# Patient Record
Sex: MALE | Race: WHITE | Employment: UNEMPLOYED | URBAN - METROPOLITAN AREA
[De-identification: names, ages, dates, MRNs, and addresses within clinical notes are randomized per-mention and may not be internally consistent; named-entity substitution may affect disease eponyms.]

---

## 2017-01-30 ENCOUNTER — HOSPITAL ENCOUNTER (EMERGENCY)
Age: 47
Discharge: HOME | End: 2017-01-31
Attending: EMERGENCY MEDICINE
Payer: MEDICAID

## 2017-01-30 VITALS
BODY MASS INDEX: 27.35 KG/M2 | SYSTOLIC BLOOD PRESSURE: 122 MMHG | HEART RATE: 95 BPM | HEIGHT: 75 IN | DIASTOLIC BLOOD PRESSURE: 85 MMHG | WEIGHT: 220 LBS | RESPIRATION RATE: 16 BRPM | TEMPERATURE: 97.4 F | OXYGEN SATURATION: 99 %

## 2017-01-30 DIAGNOSIS — L03.011 PARONYCHIA, RIGHT: Primary | ICD-10-CM

## 2017-01-30 DIAGNOSIS — S29.019A THORACIC MYOFASCIAL STRAIN, INITIAL ENCOUNTER: ICD-10-CM

## 2017-01-30 PROCEDURE — 77030008320 HC SPLNT FNGR FROG DJOR -A

## 2017-01-30 PROCEDURE — 74011000250 HC RX REV CODE- 250: Performed by: PHYSICIAN ASSISTANT

## 2017-01-30 PROCEDURE — 99284 EMERGENCY DEPT VISIT MOD MDM: CPT

## 2017-01-30 PROCEDURE — 74011250637 HC RX REV CODE- 250/637: Performed by: PHYSICIAN ASSISTANT

## 2017-01-30 PROCEDURE — 75810000289 HC I&D ABSCESS SIMP/COMP/MULT

## 2017-01-30 PROCEDURE — 77030018836 HC SOL IRR NACL ICUM -A

## 2017-01-30 RX ORDER — LIDOCAINE HYDROCHLORIDE 10 MG/ML
10 INJECTION INFILTRATION; PERINEURAL
Status: COMPLETED | OUTPATIENT
Start: 2017-01-30 | End: 2017-01-30

## 2017-01-30 RX ORDER — CEPHALEXIN 500 MG/1
500 CAPSULE ORAL 4 TIMES DAILY
Qty: 28 CAP | Refills: 0 | Status: SHIPPED | OUTPATIENT
Start: 2017-01-30 | End: 2017-02-06

## 2017-01-30 RX ORDER — LIDOCAINE HYDROCHLORIDE 10 MG/ML
10 INJECTION INFILTRATION; PERINEURAL
Status: DISCONTINUED | OUTPATIENT
Start: 2017-01-30 | End: 2017-01-30

## 2017-01-30 RX ORDER — CYCLOBENZAPRINE HCL 5 MG
5 TABLET ORAL
Qty: 15 TAB | Refills: 0 | Status: SHIPPED | OUTPATIENT
Start: 2017-01-30 | End: 2018-11-23

## 2017-01-30 RX ORDER — OXYCODONE AND ACETAMINOPHEN 5; 325 MG/1; MG/1
1 TABLET ORAL
Status: COMPLETED | OUTPATIENT
Start: 2017-01-30 | End: 2017-01-30

## 2017-01-30 RX ORDER — ONDANSETRON 4 MG/1
4 TABLET, FILM COATED ORAL
Qty: 15 TAB | Refills: 0 | Status: SHIPPED | OUTPATIENT
Start: 2017-01-30 | End: 2018-11-23

## 2017-01-30 RX ORDER — IBUPROFEN 400 MG/1
800 TABLET ORAL ONCE
Status: COMPLETED | OUTPATIENT
Start: 2017-01-30 | End: 2017-01-30

## 2017-01-30 RX ORDER — HYDROCODONE BITARTRATE AND ACETAMINOPHEN 5; 325 MG/1; MG/1
1 TABLET ORAL
Qty: 12 TAB | Refills: 0 | Status: SHIPPED | OUTPATIENT
Start: 2017-01-30 | End: 2018-06-12

## 2017-01-30 RX ORDER — DIAZEPAM 5 MG/1
5 TABLET ORAL ONCE
Status: COMPLETED | OUTPATIENT
Start: 2017-01-30 | End: 2017-01-30

## 2017-01-30 RX ADMIN — DIAZEPAM 5 MG: 5 TABLET ORAL at 23:20

## 2017-01-30 RX ADMIN — IBUPROFEN 800 MG: 400 TABLET ORAL at 23:20

## 2017-01-30 RX ADMIN — LIDOCAINE HYDROCHLORIDE 10 ML: 10 INJECTION, SOLUTION INFILTRATION; PERINEURAL at 23:20

## 2017-01-30 RX ADMIN — OXYCODONE HYDROCHLORIDE AND ACETAMINOPHEN 1 TABLET: 5; 325 TABLET ORAL at 23:19

## 2017-01-31 NOTE — ED PROVIDER NOTES
HPI Comments:   10:28 PM    Radha Sams is a 55 y.o. Male with a hx of stenosis, DDD, and chronic pain who presents to ED c/o worsening sharp right upper back/neck pain s/p moving furniture 3 days ago. Associated symptoms include right arm tingling. No blunt trauma to the back. Pt also c/o right ring finger pain/swelling s/p \"getting pricked\" with a nail a few weeks ago. Pt states he \"poked a hole\" in his finger and there was drainage. He states he has hx of this in the past and had to have it drained. Pt reports he has tried taking Tylenol with no relief. Positive tobacco use. Allergic to Tramadol and Vicodin. Pt denies known trauma and any other symptoms or complaints. Patient is a 55 y.o. male presenting with finger pain. The history is provided by the patient. No  was used. Finger Pain    This is a new problem. The problem occurs constantly. The problem has been gradually worsening. The pain is present in the right fingers. Associated symptoms include numbness (right arm; tingling), back pain (upper back) and neck pain. Past Medical History:   Diagnosis Date    Chronic pain     DDD (degenerative disc disease)     Fatigue     GERD (gastroesophageal reflux disease)     Headache     Headache(784.0)     Insomnia     Neck pain     Numbness     Paresthesias     Stenosis      moderate central stenosis as well as severe formainal stenosis at C5-6 level    Weakness        History reviewed. No pertinent past surgical history.       Family History:   Problem Relation Age of Onset    Cancer Other     Heart Disease Other     Hypertension Other     Diabetes Other     Cancer Mother 48     breast ca    Diabetes Mother     Heart Disease Mother     Hypertension Mother     Cancer Father       of CA kidney with metastasis    Heart Disease Father      tripple bypass    Asthma Sister     Hypertension Maternal Aunt        Social History     Social History    Marital status: LEGALLY      Spouse name: N/A    Number of children: N/A    Years of education: N/A     Occupational History    Not on file. Social History Main Topics    Smoking status: Current Every Day Smoker     Packs/day: 1.00    Smokeless tobacco: Never Used    Alcohol use No    Drug use: Yes     Special: Marijuana    Sexual activity: No      Comment: Not for about 3 years     Other Topics Concern    Not on file     Social History Narrative         ALLERGIES: Tramadol and Vicodin [hydrocodone-acetaminophen]    Review of Systems   Musculoskeletal: Positive for arthralgias (right ring finger), back pain (upper back), joint swelling (right ring finger) and neck pain. Neurological: Positive for numbness (right arm; tingling). All other systems reviewed and are negative. Vitals:    01/30/17 2204   BP: 122/85   Pulse: 95   Resp: 16   Temp: 97.4 °F (36.3 °C)   SpO2: 99%   Weight: 99.8 kg (220 lb)   Height: 6' 3\" (1.905 m)            Physical Exam   Constitutional: He is oriented to person, place, and time. He appears well-developed and well-nourished. No distress. Appears uncomfortable, sitting up on stretcher, non toxic, no increased work of breathing    HENT:   Head: Normocephalic and atraumatic. Neck: Normal range of motion. Neck supple. C spine non tender   Cardiovascular: Normal rate, regular rhythm, normal heart sounds and intact distal pulses. No murmur heard. Pulmonary/Chest: Effort normal and breath sounds normal. No respiratory distress. He has no wheezes. He has no rales. Abdominal: Soft. Bowel sounds are normal. There is no tenderness. Musculoskeletal:        Thoracic back: He exhibits tenderness. He exhibits normal range of motion, no bony tenderness, no swelling, no edema, no deformity and no pain. Back:    Shoulder FROM, non tender   5/5 to BUE, radial pulses 2+ bilaterally, N/V intact    Lymphadenopathy:     He has no cervical adenopathy.    Neurological: He is alert and oriented to person, place, and time. Skin:   Right 4th finger, erythema and swelling with crusting to the medial fold of the nail with fluctuance, unable to express any drainage    Psychiatric: He has a normal mood and affect. Judgment normal.   Nursing note and vitals reviewed. RESULTS:    No orders to display        Labs Reviewed - No data to display    No results found for this or any previous visit (from the past 12 hour(s)). MDM  Number of Diagnoses or Management Options  Paronychia, right:   Thoracic myofascial strain, initial encounter:   Diagnosis management comments: Paronychia  Thoracic strain,  Radiculopathy doubt fracture       ED Course     MEDICATIONS GIVEN:  Medications   ibuprofen (MOTRIN) tablet 800 mg (800 mg Oral Given 1/30/17 2320)   diazePAM (VALIUM) tablet 5 mg (5 mg Oral Given 1/30/17 2320)   oxyCODONE-acetaminophen (PERCOCET) 5-325 mg per tablet 1 Tab (1 Tab Oral Given 1/30/17 2319)   lidocaine (XYLOCAINE) 10 mg/mL (1 %) injection 10 mL (10 mL SubCUTAneous Given 1/30/17 2320)        I&D Abcess Simple  Date/Time: 1/30/2017 11:37 PM  Performed by: Haile Carlos by: Arnie ROBIN     Consent:     Consent obtained:  Verbal    Consent given by:  Patient    Risks discussed:  Infection  Location:     Type:  Abscess    Location:  Upper extremity    Upper extremity location:  Finger    Finger location:  R ring finger  Pre-procedure details:     Skin preparation:  Betadine  Anesthesia (see MAR for exact dosages):      Anesthesia method:  Nerve block    Block needle gauge:  27 G    Block anesthetic:  Lidocaine 1% w/o epi    Block injection procedure:  Anatomic landmarks identified    Block outcome:  Anesthesia achieved  Procedure type:     Complexity:  Simple  Procedure details:     Incision types:  Single straight    Incision depth:  Dermal    Scalpel blade:  11    Wound management:  Irrigated with saline    Drainage:  Bloody and purulent    Drainage amount: Moderate    Wound treatment:  Wound left open    Packing materials:  None  Post-procedure details:     Patient tolerance of procedure: Tolerated well, no immediate complications          PROGRESS NOTE:  10:28 PM  Initial assessment performed. DISCHARGE NOTE:  11:46 PM   Diana Fuller's  results have been reviewed with him. He has been counseled regarding his diagnosis, treatment, and plan. He verbally conveys understanding and agreement of the signs, symptoms, diagnosis, treatment and prognosis and additionally agrees to follow up as discussed. He also agrees with the care-plan and conveys that all of his questions have been answered. I have also provided discharge instructions for him that include: educational information regarding their diagnosis and treatment, and list of reasons why they would want to return to the ED prior to their follow-up appointment, should his condition change. The patient and/or family has been provided with education for proper Emergency Department utilization. CLINICAL IMPRESSION:    1. Paronychia, right    2. Thoracic myofascial strain, initial encounter        PLAN: DISCHARGE HOME    Follow-up Information     Follow up With Details Comments [address] [place] [address] Schedule an appointment as soon as possible for a visit Follow up with a primary care physician Becky Gutierrez [facility] [address]  [phone]    [facility] EMERGENCY DEPT  As needed, If symptoms worsen [address] [place] [address] [phone]          Discharge Medication List as of 1/30/2017 11:49 PM      START taking these medications    Details   cephALEXin (KEFLEX) 500 mg capsule Take 1 Cap by mouth four (4) times daily for 7 days. , Normal, Disp-28 Cap, R-0      HYDROcodone-acetaminophen (NORCO) 5-325 mg per tablet Take 1 Tab by mouth every four (4) hours as needed for Pain.  Max Daily Amount: 6 Tabs., Print, Disp-12 Tab, R-0      ondansetron hcl (ZOFRAN, AS HYDROCHLORIDE,) 4 mg tablet Take 1 Tab by mouth every eight (8) hours as needed for Nausea., Normal, Disp-15 Tab, R-0      cyclobenzaprine (FLEXERIL) 5 mg tablet Take 1 Tab by mouth three (3) times daily as needed for Muscle Spasm(s). , Normal, Disp-15 Tab, R-0         CONTINUE these medications which have NOT CHANGED    Details   valACYclovir (VALTREX) 500 mg tablet Take 1 Tab by mouth two (2) times a day., Normal, Disp-60 Tab, R-3             ATTESTATIONS:  This note is prepared by Yvon Eric, acting as Scribe for David Elena PA-C . David Elena PA-C: The scribe's documentation has been prepared under my direction and personally reviewed by me in its entirety. I confirm that the note above accurately reflects all work, treatment, procedures, and medical decision making performed by me.

## 2017-01-31 NOTE — DISCHARGE INSTRUCTIONS
Paronychia: Care Instructions  Your Care Instructions  Paronychia (say \"crqv-[id number]-OB-orlando-[id number]\") is an infection of the skin around a fingernail or toenail. It happens when germs enter through a break in the skin. The doctor may have made a small cut in the infected area to drain the pus. Most cases of paronychia improve in a few days. But watch your symptoms and follow your doctor's advice. Though rare, a mild case can turn into something more serious and infect your entire finger or toe. Also, it is possible for an infection to return. Follow-up care is a key part of your treatment and safety. Be sure to make and go to all appointments, and call your doctor if you are having problems. It's also a good idea to know your test results and keep a list of the medicines you take. How can you care for yourself at home? · If your doctor told you how to care for your infected nail, follow the doctor's instructions. If you did not get instructions, follow this general advice:  ¨ Wash the area with clean water 2 times a day. Don't use hydrogen peroxide or alcohol, which can slow healing. ¨ You may cover the area with a thin layer of petroleum jelly, such as Vaseline, and a nonstick bandage. ¨ Apply more petroleum jelly and replace the bandage as needed. · If your doctor prescribed antibiotics, take them as directed. Do not stop taking them just because you feel better. You need to take the full course of antibiotics. · Take an over-the-counter pain medicine, such as acetaminophen (Tylenol), ibuprofen (Advil, Motrin), or naproxen (Aleve). Read and follow all instructions on the label. · Do not take two or more pain medicines at the same time unless the doctor told you to. Many pain medicines have acetaminophen, which is Tylenol. Too much acetaminophen (Tylenol) can be harmful. · Prop up the toe or finger so that it is higher than the level of your heart. This will help with pain and swelling. · Apply heat.  Put a warm water bottle, heating pad set on low, or warm cloth on your finger or toe. Do not go to sleep with a heating pad on your skin. · Soak the area in warm water twice a day for 15 minutes each time. After soaking, dry the area well and apply a thin layer of petroleum jelly, such as Vaseline. Put on a new bandage. When should you call for help? Call your doctor now or seek immediate medical care if:  · You have signs of new or worsening infection, such as:  ¨ Increased pain, swelling, warmth, or redness. ¨ Red streaks leading from the infected skin. ¨ Pus draining from the area. ¨ A fever. Watch closely for changes in your health, and be sure to contact your doctor if:  · You develop joint aches with your infection. · Your infection comes back. · You do not get better as expected. Where can you learn more? Go to http://mikal-anirudh.info/. Enter C435 in the search box to learn more about \"Paronychia: Care Instructions. \"  Current as of: February 5, 2016  Content Version: 11.1  © 5696-9751 [employer]. Care instructions adapted under license by [employer] (which disclaims liability or warranty for this information). If you have questions about a medical condition or this instruction, always ask your healthcare professional. [place] [address] any warranty or liability for your use of this information. Muscle Strain: Care Instructions  Your Care Instructions  A muscle strain happens when you overstretch, or pull, a muscle. It can happen when you exercise or lift something or when you have an accident. Rest and other home care can help the muscle heal.  Follow-up care is a key part of your treatment and safety. Be sure to make and go to all appointments, and call your doctor if you are having problems. Its also a good idea to know your test results and keep a list of the medicines you take. How can you care for yourself at home?   · Rest the strained muscle. Do not put weight on it for a day or two. If your doctor advises you to, use crutches or a sling to rest a sore limb. · Put ice or a cold pack on the sore muscle for 10 to 20 minutes at a time to stop swelling. Put a thin cloth between the ice pack and your skin. · Prop up the sore arm or leg on a pillow when you ice it or anytime you sit or lie down during the next 3 days. Try to keep it above the level of your heart. This will help reduce swelling. · Take pain medicines exactly as directed. ¨ If the doctor gave you a prescription medicine for pain, take it as prescribed. ¨ If you are not taking a prescription pain medicine, ask your doctor if you can take an over-the-counter medicine. · Do not do anything that makes the pain worse. Return to exercise gradually as you feel better. When should you call for help? Call your doctor now or seek immediate medical care if:  · You have new severe pain. · Your injured limb is cool or pale or changes color. · You have tingling, weakness, or numbness in your injured limb. · You cannot move the injured area. Watch closely for changes in your health, and be sure to contact your doctor if:  · You cannot put weight on a joint, or it feels unsteady when you walk. · Pain and swelling get worse or do not start to get better after 2 days of home treatment. Where can you learn more? Go to http://mikal-anirudh.info/. Enter G635 in the search box to learn more about \"Muscle Strain: Care Instructions. \"  Current as of: May 23, 2016  Content Version: 11.1  © 6194-8915 [employer]. Care instructions adapted under license by [employer] (which disclaims liability or warranty for this information). If you have questions about a medical condition or this instruction, always ask your healthcare professional. [address] any warranty or liability for your use of this information.

## 2017-01-31 NOTE — ED NOTES
Presented to ED to be evaluated for reported injury to right 3rd digit 1 week ago. Patient reports pain as documented. Patient states, \"green drainage\" to finger. States he attempted to care for injury at home with no success. Finger is noted to be swollen at this time. Patient denies any additional complaints at this time with no s/s distress noted. Family member at bedside(father-in-law) reports he will be providing transportation home.

## 2017-01-31 NOTE — ED TRIAGE NOTES
Pt reports to ED c/c right 4th finger pain, and right shoulder pain onset 3 days PTA. Pt reports not taking any OTC pain reliver. Pt reports heling someone moving over the weekend.